# Patient Record
Sex: MALE | Race: WHITE | Employment: FULL TIME | URBAN - METROPOLITAN AREA
[De-identification: names, ages, dates, MRNs, and addresses within clinical notes are randomized per-mention and may not be internally consistent; named-entity substitution may affect disease eponyms.]

---

## 2023-08-15 ENCOUNTER — NURSE TRIAGE (OUTPATIENT)
Age: 58
End: 2023-08-15

## 2023-08-15 ENCOUNTER — TELEPHONE (OUTPATIENT)
Age: 58
End: 2023-08-15

## 2023-08-15 NOTE — TELEPHONE ENCOUNTER
3 weeks ago bloody semn R side pain with intercourse. Decreased urine flow, weaker stream h/o kidney stones moderate pain. Denies fever, chills, nausea or vomiting.  Appt scheduled for 09/26/23     Additional Information  • Pain in scrotum or testicle is main symptom    Protocols used: PENIS AND SCROTUM [facility]

## 2023-08-15 NOTE — TELEPHONE ENCOUNTER
Np/testicles pain not all the time. Pt states that he has pain in his testicles more when he has an erections and it has been going on for awhile. He also states that this has happened before when he had kidney stones. The pain had gone into this testicles. He states that he is slower flow in urine as well. No pain. It is mostly in right testicle.      Pt can be reached at [phone]

## 2023-08-29 ENCOUNTER — OFFICE VISIT (OUTPATIENT)
Dept: UROLOGY | Facility: CLINIC | Age: 58
End: 2023-08-29
Payer: COMMERCIAL

## 2023-08-29 VITALS — WEIGHT: 127 LBS | HEART RATE: 68 BPM | DIASTOLIC BLOOD PRESSURE: 80 MMHG | SYSTOLIC BLOOD PRESSURE: 132 MMHG

## 2023-08-29 DIAGNOSIS — Z12.5 PROSTATE CANCER SCREENING: ICD-10-CM

## 2023-08-29 DIAGNOSIS — R39.12 WEAK URINARY STREAM: ICD-10-CM

## 2023-08-29 DIAGNOSIS — N50.812 PAIN IN BOTH TESTICLES: Primary | ICD-10-CM

## 2023-08-29 DIAGNOSIS — N50.811 PAIN IN BOTH TESTICLES: Primary | ICD-10-CM

## 2023-08-29 PROCEDURE — 99203 OFFICE O/P NEW LOW 30 MIN: CPT

## 2023-08-29 RX ORDER — NADOLOL 40 MG/1
40 TABLET ORAL DAILY
COMMUNITY

## 2023-08-29 RX ORDER — DEXTROAMPHETAMINE SULFATE, DEXTROAMPHETAMINE SACCHARATE, AMPHETAMINE SULFATE AND AMPHETAMINE ASPARTATE 7.5; 7.5; 7.5; 7.5 MG/1; MG/1; MG/1; MG/1
30 CAPSULE, EXTENDED RELEASE ORAL 2 TIMES DAILY
COMMUNITY
Start: 2023-06-28

## 2023-08-29 RX ORDER — BENAZEPRIL HYDROCHLORIDE 20 MG/1
20 TABLET ORAL DAILY
COMMUNITY

## 2023-08-29 NOTE — PROGRESS NOTES
Caller: Tessie Mckeon    Relationship to patient: Self    Best call back number: [phone]    Patient is needing: BLOOD PRESSURE READINGS FOR PATIENT     12/24- 145/78 PULSE 69  12/26- 135/75 PULSE 65  12/27- 135/75 PULSE 65  12/28- 132/72 PULSE 67  12/29- 133/72 PULSE 62  12/31- 138/76 PULSE 72  1/4- 132/73 PULSE 75       8/29/2023    No chief complaint on file. Assessment and Plan    60 y.o. male new patient to office    1. Testicular pain  · Chronic dull bilateral testicular pain ongoing for approximately 1 year. Physical exam is benign. · I suspect he is likely suffering from epididymal hypertension as his pain will worsen after use of Viagra and continues until its effects were off. Cannot exclude testicular torsion however physical exam findings were normal and patient denies any twisting of the testicles. · Urine is negative for infection and he denies any concern for STIs. He was offered GC/chlamydia testing but declined. · I am recommending conservative treatment with scrotal support, NSAIDs, and heat/ice for pain. We will check a baseline ultrasound scrotum and testicles and he will follow-up in 4 weeks. 2. BPH with LUTS  · Mild symptoms of weak intermittent urinary stream no nocturia or sensation of incomplete bladder emptying  · MAIDA reveals mild prostate enlargement with estimated gland size between 30 to 35 g. · We discussed options of trialing Flomax 0.4 mg at bedtime versus continued watchful waiting. Patient elected for continued watchful waiting at this time    3. Prostate cancer screening  · Negative family history of prostate cancer  · MAIDA benign  · PSA ordered I will call him with those results. History of Present Illness  Frida Encarnacion is a 60 y.o. male new patient to office. Here for evaluation of testicular pain. He reports that approximately 1 year ago he started with a constant dull bilateral testicular pain without known injury or trauma. Just prior to this about 18 months ago he had been having issues with erectile dysfunction and his PCP prescribed him Viagra which he takes 100 mg on demand. He has noticed that when he takes Viagra his testicular pain becomes more severe in nature and will last until its effects wear off.   He also has issues with hypertension and has been trying to get this under control as well. He is a current smoker. He denies any prior urologic history. He does have baseline lower urinary tract symptoms of intermittent weak urinary stream but denies any nocturia or incomplete bladder emptying. No history of sexually transmitted infections or concern for STIs today. He does report history of kidney stones many years ago but denies any gross hematuria or flank pain today. No family history of prostate cancer or testicular cancer. Review of Systems   Constitutional: Negative for chills and fever. HENT: Negative for congestion and sore throat. Respiratory: Negative for cough and shortness of breath. Cardiovascular: Negative for chest pain and leg swelling. Gastrointestinal: Negative for abdominal pain, constipation and diarrhea. Genitourinary: Positive for testicular pain. Negative for difficulty urinating, dysuria, frequency, hematuria and urgency. Musculoskeletal: Negative for back pain and gait problem. Skin: Negative for wound. Allergic/Immunologic: Negative for immunocompromised state. Hematological: Does not bruise/bleed easily. Vitals  Vitals:    08/29/23 1002   BP: 132/80   Pulse: 68   Weight: 57.6 kg (127 lb)       Physical Exam  Vitals reviewed. Constitutional:       General: He is not in acute distress. Appearance: Normal appearance. He is not ill-appearing or toxic-appearing. HENT:      Head: Normocephalic and atraumatic. Eyes:      General: No scleral icterus. Conjunctiva/sclera: Conjunctivae normal.   Cardiovascular:      Rate and Rhythm: Normal rate. Pulmonary:      Effort: Pulmonary effort is normal. No respiratory distress. Abdominal:      Palpations: Abdomen is soft. Tenderness: There is no abdominal tenderness. There is no right CVA tenderness or left CVA tenderness. Hernia: No hernia is present.    Genitourinary:     Comments: Normal circumcised phallus, testicles are descended equally and bilaterally. Smooth, and symmetric. Patient does have mild tenderness with palpation however exam is unremarkable. MAIDA reveals a smooth symmetric prostate, no palpable nodules or masses. Estimated gland size between 30 to 35 g. Musculoskeletal:      Cervical back: Normal range of motion. Right lower leg: No edema. Left lower leg: No edema. Skin:     General: Skin is warm and dry. Coloration: Skin is not jaundiced or pale. Neurological:      General: No focal deficit present. Mental Status: He is alert and oriented to person, place, and time. Mental status is at baseline. Gait: Gait normal.   Psychiatric:         Mood and Affect: Mood normal.         Behavior: Behavior normal.         Thought Content:  Thought content normal.         Judgment: Judgment normal.         Past History  Past Medical History:   Diagnosis Date   • ADHD    • Hypertension      Social History     Socioeconomic History   • Marital status: Unknown     Spouse name: None   • Number of children: None   • Years of education: None   • Highest education level: None   Occupational History   • None   Tobacco Use   • Smoking status: Every Day     Packs/day: 1.00     Types: Cigarettes   • Smokeless tobacco: Never   Vaping Use   • Vaping Use: Never used   Substance and Sexual Activity   • Alcohol use: Yes     Comment: rare once ot twice a year   • Drug use: Yes     Types: Marijuana   • Sexual activity: Yes   Other Topics Concern   • None   Social History Narrative   • None     Social Determinants of Health     Financial Resource Strain: Not on file   Food Insecurity: Not on file   Transportation Needs: Not on file   Physical Activity: Not on file   Stress: Not on file   Social Connections: Not on file   Intimate Partner Violence: Not on file   Housing Stability: Not on file     Social History     Tobacco Use   Smoking Status Every Day   • Packs/day: 1.00   • Types: Cigarettes   Smokeless Tobacco Never     Family History   Problem Relation Age of Onset   • Cancer Father    • Cancer Paternal Grandmother    • Hypertension Paternal Grandfather    • Heart disease Paternal Grandfather    • Cancer Brother        The following portions of the patient's history were reviewed and updated as appropriate allergies, current medications, past medical history, past social history, past surgical history and problem list    Imaging:    Results  No results found for this or any previous visit (from the past 1 hour(s)). ]  No results found for: "PSA"  No results found for: "GLUCOSE", "CALCIUM", "NA", "K", "CO2", "CL", "BUN", "CREATININE"  No results found for: "WBC", "HGB", "HCT", "MCV", "PLT"    Please Note:  Voice dictation software has been used to create this document. There may be inadvertent transcriptions errors.      JIM Ochoa 08/29/23